# Patient Record
Sex: MALE | Race: WHITE | NOT HISPANIC OR LATINO | Employment: OTHER | URBAN - METROPOLITAN AREA
[De-identification: names, ages, dates, MRNs, and addresses within clinical notes are randomized per-mention and may not be internally consistent; named-entity substitution may affect disease eponyms.]

---

## 2021-04-08 ENCOUNTER — OFFICE VISIT (OUTPATIENT)
Dept: WOUND CARE | Facility: HOSPITAL | Age: 71
End: 2021-04-08

## 2021-04-08 PROCEDURE — G0463 HOSPITAL OUTPT CLINIC VISIT: HCPCS

## 2021-06-15 ENCOUNTER — TRANSCRIBE ORDERS (OUTPATIENT)
Dept: ADMINISTRATIVE | Facility: HOSPITAL | Age: 71
End: 2021-06-15

## 2021-06-15 DIAGNOSIS — C44.41 BASAL CELL CARCINOMA (BCC) OF SCALP: Primary | ICD-10-CM

## 2021-06-28 ENCOUNTER — TRANSCRIBE ORDERS (OUTPATIENT)
Dept: ADMINISTRATIVE | Facility: HOSPITAL | Age: 71
End: 2021-06-28

## 2021-06-29 ENCOUNTER — HOSPITAL ENCOUNTER (OUTPATIENT)
Dept: MRI IMAGING | Facility: HOSPITAL | Age: 71
Discharge: HOME | End: 2021-06-29
Admitting: DERMATOLOGY

## 2021-06-29 DIAGNOSIS — C44.41 BASAL CELL CARCINOMA (BCC) OF SCALP: ICD-10-CM

## 2021-06-29 LAB — CREAT BLDA-MCNC: 1.2 MG/DL (ref 0.6–1.3)

## 2021-06-29 PROCEDURE — 25010000002 GADOTERIDOL PER 1 ML: Performed by: DERMATOLOGY

## 2021-06-29 PROCEDURE — 82565 ASSAY OF CREATININE: CPT

## 2021-06-29 PROCEDURE — 70543 MRI ORBT/FAC/NCK W/O &W/DYE: CPT

## 2021-06-29 PROCEDURE — A9579 GAD-BASE MR CONTRAST NOS,1ML: HCPCS | Performed by: DERMATOLOGY

## 2021-06-29 RX ADMIN — GADOTERIDOL 20 ML: 279.3 INJECTION, SOLUTION INTRAVENOUS at 15:47

## 2023-05-10 ENCOUNTER — TELEPHONE (OUTPATIENT)
Dept: RADIATION ONCOLOGY | Facility: HOSPITAL | Age: 73
End: 2023-05-10

## 2023-05-10 NOTE — TELEPHONE ENCOUNTER
Left message with a return number to call. Calling to schedule a consult appt with Dr La per referral of [facility].

## 2023-05-11 ENCOUNTER — HOSPITAL ENCOUNTER (OUTPATIENT)
Dept: RADIATION ONCOLOGY | Facility: HOSPITAL | Age: 73
Setting detail: RADIATION/ONCOLOGY SERIES
End: 2023-05-11
Payer: MEDICAID

## 2023-05-16 PROBLEM — C44.219 BASAL CELL CARCINOMA (BCC) OF HELIX OF LEFT EAR: Status: ACTIVE | Noted: 2023-05-16

## 2023-05-16 NOTE — PROGRESS NOTES
RADIATION THERAPY CONSULT NOTE    NAME: Jed Strong  YOB: 1950  MRN #: [id number]  DATE OF SERVICE: 5/18/2023  REFERRING PROVIDER: Kiet Frank Jr., MD  [address] [facility] [place] [address]  PRIMARY CARE PROVIDER: Jessika Murray APRN    DIAGNOSIS:    Encounter Diagnosis   Name Primary?   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                              REQUESTING PHYSICIAN:    Kiet Frank Jr., Md  [address] [place] [address]    RECORDS OBTAINED:  Records of the patients history including those obtained from the referring provider were reviewed and summarized in detail.    HISTORY OF PRESENT ILLNESS:  Jed Strong is a 73 y.o. male who was recently diagnosed with recurrent infiltrative basal cell carcinoma of the left inferior posterior helix on 4/17/2023 following biopsy by shave method. The area was growing and not healing and moderate in severity. He had the growth for years.  His initial basal cell on the L ear in this region was 2004 per the patient.  Previous history of nonmelanoma skin cancer and sunburns. He has no history of immunosuppression and no history of organ transplantation.    He underwent Mohs surgical procedure on 5/9/2023 by Dr. Stanley Howard. Preop size 1.6 cm x 1.1 cm.  Mohs was performed in 2 stages; final defect size 3.5 cm x 1.6 cm. Frozen section analysis after stage II showed no residual tumor and no malignant cells in the sections that were examined, though perineural inflammation was present. Margins were negative. Complicating clinical features of the lesion include clinical area critical for tissue conservation, large size, located in area of high recurrence, poorly  defined clinical tumor borders, and suspicious deep tissue invasion of the tumor. He was referred to our clinic for consideration of adjuvant radiation therapy.     The following portions of the patient's history were reviewed and updated as appropriate: allergies, current medications, past family history, past medical history, past social history, past surgical history and problem list. Reviewed with the patient and remain unchanged.    PAST MEDICAL HISTORY:  he has a past medical history of Blindness of both eyes.    MEDICATIONS:  No current outpatient medications on file.    ALLERGIES:  No Known Allergies  PAST SURGICAL HISTORY:  he has no past surgical history on file.    PREVIOUS RADIOTHERAPY OR CHEMOTHERAPY:  No prior XRT; Erivedge topical used.    FAMILY HISTORY:  Non-contributory    SOCIAL HISTORY: Working full time as a LCSW.    he reports that he quit smoking about 30 years ago. His smoking use included cigarettes. He has a 30.00 pack-year smoking history. He has never used smokeless tobacco. He reports current alcohol use. He reports that he does not use drugs.    PAIN AND PAIN MANAGEMENT:  Denies pain.  Vitals:    05/18/23 1406   BP: 151/83   Pulse: 78   Resp: 18   SpO2: 95%   Weight: 109 kg (240 lb)   PainSc: 0-No pain     NUTRITIONAL STATUS:   no issues  KPS:  90      REVIEW OF SYSTEMS:   Review of Systems   General: No fevers, chills, weight change, or drenching night sweats. Skin: as noted above.  HEENT: No change in vision or hearing, no headaches.  Neck: No dysphagia or masses.  Heme/Lymph: No easy bruising or bleeding.  Respiratory System: No shortness of breath or cough.  Cardiovascular: No chest pain, palpitations, or dyspnea on exertion.  - Pacemaker. GI: No nausea, vomiting, diarrhea, melena, or hematochezia.  : No dysuria or hematuria.  Endocrine: No heat or cold intolerance. Musculoskeletal: No myalgias or arthralgias.  Neuro: No weakness, numbness, syncope, or seizures. Psych: No mood  changes or depression. Ext: Denies swelling.      Objective   VITAL SIGNS:  Vitals:    05/18/23 1406   BP: 151/83   Pulse: 78   Resp: 18   SpO2: 95%   Weight: 109 kg (240 lb)   PainSc: 0-No pain     PHYSICAL EXAM:  GENERAL:  No apparent distress. Sitting comfortably in room.    HEENT:  Normocephalic, atraumatic. Pupils are equal, round, reactive to light. Sclera anicteric. Conjunctiva not injected. Oropharynx without erythema, ulcerations or thrush.   NECK:  Supple with no masses.  LYMPHATIC:  No cervical, supraclavicular or axillary adenopathy appreciated bilaterally.   CARDIOVASCULAR:  S1 & S2 detected; no murmurs, rubs or gallops.  CHEST:  Clear to auscultation bilaterally; no wheezes, crackles or rubs. Work of breathing normal.  ABDOMEN:  Bowel sounds present. Abdomen is soft, nontender, nondistended.   MUSCULOSKELETAL:  No tenderness to palpation along the spine or scapulae. Normal range of motion.  EXTREMITIES:  No clubbing, cyanosis, edema.  SKIN:  L ear healing appropriately, sore area behind:    NEUROLOGIC:  Cranial nerves II-XII grossly intact bilaterally. No focal neurologic deficits.  PSYCHIATRIC:  Alert, aware, and appropriate.      PERTINENT IMAGING/PATHOLOGY/LABS (Medical Decision Making):      COORDINATION OF CARE:  A copy of this note is sent to the referring provider.    PATHOLOGY (Reviewed): as noted above  Perineural and recurrenct status lead to adjuvant XRT recommendation.    IMAGING (Reviewed):     Lab Results   Component Value Date    CREATININE 1.20 06/29/2021     Assessment & Plan   ASSESSMENT AND PLAN:    1. 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  -Informed consent obtained.    This assessment comes from my review of  the imaging, pathology, physician notes and other pertinent information as mentioned.    DISPOSITION:  CT sim ordered.    TIME SPENT WITH PATIENT:  I spent 45 minutes caring for Jed on this date of service. This time includes time spent by me in the following activities: preparing for the visit, reviewing tests, obtaining and/or reviewing a separately obtained history, performing a medically appropriate examination and/or evaluation, counseling and educating the patient/family/caregiver, documenting information in the medical record, independently interpreting results and communicating that information with the patient/family/caregiver and care coordination           CC: Kiet Frank Jr., * Jessika Murray APRN John A Cox, MD  5/18/2023  3:58 PM EDT

## 2023-05-18 ENCOUNTER — CONSULT (OUTPATIENT)
Dept: RADIATION ONCOLOGY | Facility: HOSPITAL | Age: 73
End: 2023-05-18
Payer: MEDICAID

## 2023-05-18 VITALS
SYSTOLIC BLOOD PRESSURE: 151 MMHG | HEART RATE: 78 BPM | DIASTOLIC BLOOD PRESSURE: 83 MMHG | OXYGEN SATURATION: 95 % | WEIGHT: 240 LBS | RESPIRATION RATE: 18 BRPM

## 2023-05-18 DIAGNOSIS — C44.219: Primary | ICD-10-CM

## 2023-05-18 PROCEDURE — G0463 HOSPITAL OUTPT CLINIC VISIT: HCPCS | Performed by: RADIOLOGY

## 2023-06-01 ENCOUNTER — HOSPITAL ENCOUNTER (OUTPATIENT)
Dept: RADIATION ONCOLOGY | Facility: HOSPITAL | Age: 73
Discharge: HOME | End: 2023-06-01

## 2023-08-04 ENCOUNTER — HOSPITAL ENCOUNTER (OUTPATIENT)
Dept: RADIATION ONCOLOGY | Facility: HOSPITAL | Age: 73
Setting detail: RADIATION/ONCOLOGY SERIES
End: 2023-08-04
Payer: COMMERCIAL

## 2023-08-07 ENCOUNTER — HOSPITAL ENCOUNTER (OUTPATIENT)
Dept: RADIATION ONCOLOGY | Facility: HOSPITAL | Age: 73
Discharge: HOME | End: 2023-08-07
Payer: COMMERCIAL

## 2023-08-07 PROCEDURE — 77290 THER RAD SIMULAJ FIELD CPLX: CPT | Performed by: RADIOLOGY

## 2023-08-07 PROCEDURE — 77334 RADIATION TREATMENT AID(S): CPT | Performed by: RADIOLOGY

## 2023-09-12 ENCOUNTER — HOSPITAL ENCOUNTER (OUTPATIENT)
Dept: RADIATION ONCOLOGY | Facility: HOSPITAL | Age: 73
Setting detail: RADIATION/ONCOLOGY SERIES
End: 2023-09-12
Payer: COMMERCIAL

## 2023-09-13 ENCOUNTER — HOSPITAL ENCOUNTER (OUTPATIENT)
Dept: RADIATION ONCOLOGY | Facility: HOSPITAL | Age: 73
Discharge: HOME | End: 2023-09-13
Payer: COMMERCIAL

## 2023-09-18 ENCOUNTER — APPOINTMENT (OUTPATIENT)
Dept: RADIATION ONCOLOGY | Facility: HOSPITAL | Age: 73
End: 2023-09-18
Payer: COMMERCIAL

## 2023-09-18 PROCEDURE — 77301 RADIOTHERAPY DOSE PLAN IMRT: CPT | Performed by: RADIOLOGY

## 2023-09-18 PROCEDURE — 77300 RADIATION THERAPY DOSE PLAN: CPT | Performed by: RADIOLOGY

## 2023-09-18 PROCEDURE — 77338 DESIGN MLC DEVICE FOR IMRT: CPT | Performed by: RADIOLOGY

## 2023-09-19 ENCOUNTER — HOSPITAL ENCOUNTER (OUTPATIENT)
Dept: RADIATION ONCOLOGY | Facility: HOSPITAL | Age: 73
Discharge: HOME | End: 2023-09-19
Payer: COMMERCIAL

## 2023-09-19 LAB
RAD ONC ARIA COURSE ID: NORMAL
RAD ONC ARIA COURSE LAST TREATMENT DATE: NORMAL
RAD ONC ARIA COURSE START DATE: NORMAL
RAD ONC ARIA COURSE TREATMENT ELAPSED DAYS: 0
RAD ONC ARIA FIRST TREATMENT DATE: NORMAL
RAD ONC ARIA PLAN FRACTIONS TREATED TO DATE: 1
RAD ONC ARIA PLAN ID: NORMAL
RAD ONC ARIA PLAN PRESCRIBED DOSE PER FRACTION: 2 GY
RAD ONC ARIA PLAN PRIMARY REFERENCE POINT: NORMAL
RAD ONC ARIA PLAN TOTAL FRACTIONS PRESCRIBED: 33
RAD ONC ARIA PLAN TOTAL PRESCRIBED DOSE: 6600 CGY
RAD ONC ARIA REFERENCE POINT DOSAGE GIVEN TO DATE: 2 GY
RAD ONC ARIA REFERENCE POINT ID: NORMAL
RAD ONC ARIA REFERENCE POINT SESSION DOSAGE GIVEN: 2 GY

## 2023-09-19 PROCEDURE — 77386: CPT | Performed by: RADIOLOGY

## 2023-09-20 ENCOUNTER — HOSPITAL ENCOUNTER (OUTPATIENT)
Dept: RADIATION ONCOLOGY | Facility: HOSPITAL | Age: 73
Discharge: HOME | End: 2023-09-20
Payer: COMMERCIAL

## 2023-09-20 LAB
RAD ONC ARIA COURSE ID: NORMAL
RAD ONC ARIA COURSE LAST TREATMENT DATE: NORMAL
RAD ONC ARIA COURSE START DATE: NORMAL
RAD ONC ARIA COURSE TREATMENT ELAPSED DAYS: 1
RAD ONC ARIA FIRST TREATMENT DATE: NORMAL
RAD ONC ARIA PLAN FRACTIONS TREATED TO DATE: 1
RAD ONC ARIA PLAN ID: NORMAL
RAD ONC ARIA PLAN PRESCRIBED DOSE PER FRACTION: 2 GY
RAD ONC ARIA PLAN PRIMARY REFERENCE POINT: NORMAL
RAD ONC ARIA PLAN TOTAL FRACTIONS PRESCRIBED: 32
RAD ONC ARIA PLAN TOTAL PRESCRIBED DOSE: 6400 CGY
RAD ONC ARIA REFERENCE POINT DOSAGE GIVEN TO DATE: 4 GY
RAD ONC ARIA REFERENCE POINT ID: NORMAL
RAD ONC ARIA REFERENCE POINT SESSION DOSAGE GIVEN: 2 GY

## 2023-09-20 PROCEDURE — 77386: CPT | Performed by: RADIOLOGY

## 2023-09-21 ENCOUNTER — HOSPITAL ENCOUNTER (OUTPATIENT)
Dept: RADIATION ONCOLOGY | Facility: HOSPITAL | Age: 73
Discharge: HOME | End: 2023-09-21
Payer: COMMERCIAL

## 2023-09-21 LAB
RAD ONC ARIA COURSE ID: NORMAL
RAD ONC ARIA COURSE LAST TREATMENT DATE: NORMAL
RAD ONC ARIA COURSE START DATE: NORMAL
RAD ONC ARIA COURSE TREATMENT ELAPSED DAYS: 2
RAD ONC ARIA FIRST TREATMENT DATE: NORMAL
RAD ONC ARIA PLAN FRACTIONS TREATED TO DATE: 1
RAD ONC ARIA PLAN ID: NORMAL
RAD ONC ARIA PLAN PRESCRIBED DOSE PER FRACTION: 2 GY
RAD ONC ARIA PLAN PRIMARY REFERENCE POINT: NORMAL
RAD ONC ARIA PLAN TOTAL FRACTIONS PRESCRIBED: 31
RAD ONC ARIA PLAN TOTAL PRESCRIBED DOSE: 6200 CGY
RAD ONC ARIA REFERENCE POINT DOSAGE GIVEN TO DATE: 6 GY
RAD ONC ARIA REFERENCE POINT ID: NORMAL
RAD ONC ARIA REFERENCE POINT SESSION DOSAGE GIVEN: 2 GY

## 2023-09-21 PROCEDURE — 77336 RADIATION PHYSICS CONSULT: CPT | Performed by: RADIOLOGY

## 2023-09-21 PROCEDURE — 77386: CPT | Performed by: RADIOLOGY

## 2023-09-22 ENCOUNTER — HOSPITAL ENCOUNTER (OUTPATIENT)
Dept: RADIATION ONCOLOGY | Facility: HOSPITAL | Age: 73
Discharge: HOME | End: 2023-09-22
Payer: COMMERCIAL

## 2023-09-22 LAB
RAD ONC ARIA COURSE ID: NORMAL
RAD ONC ARIA COURSE LAST TREATMENT DATE: NORMAL
RAD ONC ARIA COURSE START DATE: NORMAL
RAD ONC ARIA COURSE TREATMENT ELAPSED DAYS: 3
RAD ONC ARIA FIRST TREATMENT DATE: NORMAL
RAD ONC ARIA PLAN FRACTIONS TREATED TO DATE: 2
RAD ONC ARIA PLAN ID: NORMAL
RAD ONC ARIA PLAN PRESCRIBED DOSE PER FRACTION: 2 GY
RAD ONC ARIA PLAN PRIMARY REFERENCE POINT: NORMAL
RAD ONC ARIA PLAN TOTAL FRACTIONS PRESCRIBED: 31
RAD ONC ARIA PLAN TOTAL PRESCRIBED DOSE: 6200 CGY
RAD ONC ARIA REFERENCE POINT DOSAGE GIVEN TO DATE: 8 GY
RAD ONC ARIA REFERENCE POINT ID: NORMAL
RAD ONC ARIA REFERENCE POINT SESSION DOSAGE GIVEN: 2 GY

## 2023-09-22 PROCEDURE — 77386: CPT | Performed by: RADIOLOGY

## 2023-09-25 ENCOUNTER — RADIATION ONCOLOGY WEEKLY ASSESSMENT (OUTPATIENT)
Dept: RADIATION ONCOLOGY | Facility: HOSPITAL | Age: 73
End: 2023-09-25

## 2023-09-25 ENCOUNTER — HOSPITAL ENCOUNTER (OUTPATIENT)
Dept: RADIATION ONCOLOGY | Facility: HOSPITAL | Age: 73
Discharge: HOME | End: 2023-09-25
Payer: COMMERCIAL

## 2023-09-25 VITALS
OXYGEN SATURATION: 95 % | RESPIRATION RATE: 18 BRPM | SYSTOLIC BLOOD PRESSURE: 144 MMHG | HEART RATE: 66 BPM | DIASTOLIC BLOOD PRESSURE: 85 MMHG

## 2023-09-25 DIAGNOSIS — C44.219: Primary | ICD-10-CM

## 2023-09-25 LAB
RAD ONC ARIA COURSE ID: NORMAL
RAD ONC ARIA COURSE LAST TREATMENT DATE: NORMAL
RAD ONC ARIA COURSE START DATE: NORMAL
RAD ONC ARIA COURSE TREATMENT ELAPSED DAYS: 6
RAD ONC ARIA FIRST TREATMENT DATE: NORMAL
RAD ONC ARIA PLAN FRACTIONS TREATED TO DATE: 3
RAD ONC ARIA PLAN ID: NORMAL
RAD ONC ARIA PLAN PRESCRIBED DOSE PER FRACTION: 2 GY
RAD ONC ARIA PLAN PRIMARY REFERENCE POINT: NORMAL
RAD ONC ARIA PLAN TOTAL FRACTIONS PRESCRIBED: 31
RAD ONC ARIA PLAN TOTAL PRESCRIBED DOSE: 6200 CGY
RAD ONC ARIA REFERENCE POINT DOSAGE GIVEN TO DATE: 10 GY
RAD ONC ARIA REFERENCE POINT ID: NORMAL
RAD ONC ARIA REFERENCE POINT SESSION DOSAGE GIVEN: 2 GY

## 2023-09-25 PROCEDURE — 77386: CPT | Performed by: RADIOLOGY

## 2023-09-25 NOTE — PROGRESS NOTES
NAME: Jed Strong DATE: 2023   : 1950    MRN: [id number] DIAGNOSIS:   Encounter Diagnosis   Name Primary?    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 ID Energy Dose/Fx (cGy) #Fx Dose Correction (cGy) Total Dose (cGy) Start Date End Date Elapsed Days   LtEar:1 LtEar 6X 200 2 / 2 0 400 2023 1   LtEar:2 LtEar 6X 200 3 /  0 600 2023 5      fractions delivered  Recurrent area post-auricular    General:     Review of Systems  [x] No new complaints [] Cough   [] Dysphagia  [] Bowel changes   [] Fever/chills  [] Nausea/vomiting  [] Skin itching/soreness/breakdown  [x] Fatigue, severity: 0 [x] Pain, severity: 0, location:     Nausea regimen:   Pain medication regimen:   Bowel regimen:   Skin regimen: aquaphor    Subjective:  drainage from recurrence behind the ear has stopped.    KPS: 90     Vital Signs: /85   Pulse 66   Resp 18   SpO2 95%     Weight:   Wt Readings from Last 3 Encounters:   23 109 kg (240 lb)       Medication: No current outpatient medications on file.     LABS (Reviewed):    Chemistry   Lab Results   Component Value Date    CREATININE 1.20 2021       Physical Exam:     Neck: [] Lymphadenopathy  Lungs: [x] Clear to auscultation  CVS: [x] Regular rate & rhythm  Skin: [x] stGstrstastdstest:st st1st Comments/Notes:      [x] Review of labs, images, dosimetry, dose delivered, & treatment parameters.   Comments:     [x] Patient treatment setup reviewed.    Comments:     Recommendations:  continue XRT as noted  Skin care discussed    [x] Continue RT  [] Change RT Plan [] Hold RT, length:      Approved Electronically By:  Ian La MD, 2023, 12:17 EDT      Confidentiality of this medical record shall be maintained except when use or disclosure is required or permitted by law, regulation or written authorization by the patient.

## 2023-09-26 ENCOUNTER — HOSPITAL ENCOUNTER (OUTPATIENT)
Dept: RADIATION ONCOLOGY | Facility: HOSPITAL | Age: 73
Discharge: HOME | End: 2023-09-26
Payer: COMMERCIAL

## 2023-09-26 LAB
RAD ONC ARIA COURSE ID: NORMAL
RAD ONC ARIA COURSE LAST TREATMENT DATE: NORMAL
RAD ONC ARIA COURSE START DATE: NORMAL
RAD ONC ARIA COURSE TREATMENT ELAPSED DAYS: 7
RAD ONC ARIA FIRST TREATMENT DATE: NORMAL
RAD ONC ARIA PLAN FRACTIONS TREATED TO DATE: 4
RAD ONC ARIA PLAN ID: NORMAL
RAD ONC ARIA PLAN PRESCRIBED DOSE PER FRACTION: 2 GY
RAD ONC ARIA PLAN PRIMARY REFERENCE POINT: NORMAL
RAD ONC ARIA PLAN TOTAL FRACTIONS PRESCRIBED: 31
RAD ONC ARIA PLAN TOTAL PRESCRIBED DOSE: 6200 CGY
RAD ONC ARIA REFERENCE POINT DOSAGE GIVEN TO DATE: 12 GY
RAD ONC ARIA REFERENCE POINT ID: NORMAL
RAD ONC ARIA REFERENCE POINT SESSION DOSAGE GIVEN: 2 GY

## 2023-09-26 PROCEDURE — 77386: CPT | Performed by: RADIOLOGY

## 2023-09-27 ENCOUNTER — HOSPITAL ENCOUNTER (OUTPATIENT)
Dept: RADIATION ONCOLOGY | Facility: HOSPITAL | Age: 73
Discharge: HOME | End: 2023-09-27
Payer: COMMERCIAL

## 2023-09-27 LAB
RAD ONC ARIA COURSE ID: NORMAL
RAD ONC ARIA COURSE LAST TREATMENT DATE: NORMAL
RAD ONC ARIA COURSE START DATE: NORMAL
RAD ONC ARIA COURSE TREATMENT ELAPSED DAYS: 8
RAD ONC ARIA FIRST TREATMENT DATE: NORMAL
RAD ONC ARIA PLAN FRACTIONS TREATED TO DATE: 5
RAD ONC ARIA PLAN ID: NORMAL
RAD ONC ARIA PLAN PRESCRIBED DOSE PER FRACTION: 2 GY
RAD ONC ARIA PLAN PRIMARY REFERENCE POINT: NORMAL
RAD ONC ARIA PLAN TOTAL FRACTIONS PRESCRIBED: 31
RAD ONC ARIA PLAN TOTAL PRESCRIBED DOSE: 6200 CGY
RAD ONC ARIA REFERENCE POINT DOSAGE GIVEN TO DATE: 14 GY
RAD ONC ARIA REFERENCE POINT ID: NORMAL
RAD ONC ARIA REFERENCE POINT SESSION DOSAGE GIVEN: 2 GY

## 2023-09-27 PROCEDURE — 77336 RADIATION PHYSICS CONSULT: CPT | Performed by: RADIOLOGY

## 2023-09-27 PROCEDURE — 77386: CPT | Performed by: RADIOLOGY

## 2023-09-28 ENCOUNTER — HOSPITAL ENCOUNTER (OUTPATIENT)
Dept: RADIATION ONCOLOGY | Facility: HOSPITAL | Age: 73
Discharge: HOME | End: 2023-09-28
Payer: COMMERCIAL

## 2023-09-28 LAB
RAD ONC ARIA COURSE ID: NORMAL
RAD ONC ARIA COURSE LAST TREATMENT DATE: NORMAL
RAD ONC ARIA COURSE START DATE: NORMAL
RAD ONC ARIA COURSE TREATMENT ELAPSED DAYS: 9
RAD ONC ARIA FIRST TREATMENT DATE: NORMAL
RAD ONC ARIA PLAN FRACTIONS TREATED TO DATE: 6
RAD ONC ARIA PLAN ID: NORMAL
RAD ONC ARIA PLAN PRESCRIBED DOSE PER FRACTION: 2 GY
RAD ONC ARIA PLAN PRIMARY REFERENCE POINT: NORMAL
RAD ONC ARIA PLAN TOTAL FRACTIONS PRESCRIBED: 31
RAD ONC ARIA PLAN TOTAL PRESCRIBED DOSE: 6200 CGY
RAD ONC ARIA REFERENCE POINT DOSAGE GIVEN TO DATE: 16 GY
RAD ONC ARIA REFERENCE POINT ID: NORMAL
RAD ONC ARIA REFERENCE POINT SESSION DOSAGE GIVEN: 2 GY

## 2023-09-28 PROCEDURE — 77386: CPT | Performed by: RADIOLOGY

## 2023-09-29 ENCOUNTER — HOSPITAL ENCOUNTER (OUTPATIENT)
Dept: RADIATION ONCOLOGY | Facility: HOSPITAL | Age: 73
Discharge: HOME | End: 2023-09-29
Payer: COMMERCIAL

## 2023-09-29 LAB
RAD ONC ARIA COURSE ID: NORMAL
RAD ONC ARIA COURSE LAST TREATMENT DATE: NORMAL
RAD ONC ARIA COURSE START DATE: NORMAL
RAD ONC ARIA COURSE TREATMENT ELAPSED DAYS: 10
RAD ONC ARIA FIRST TREATMENT DATE: NORMAL
RAD ONC ARIA PLAN FRACTIONS TREATED TO DATE: 7
RAD ONC ARIA PLAN ID: NORMAL
RAD ONC ARIA PLAN PRESCRIBED DOSE PER FRACTION: 2 GY
RAD ONC ARIA PLAN PRIMARY REFERENCE POINT: NORMAL
RAD ONC ARIA PLAN TOTAL FRACTIONS PRESCRIBED: 31
RAD ONC ARIA PLAN TOTAL PRESCRIBED DOSE: 6200 CGY
RAD ONC ARIA REFERENCE POINT DOSAGE GIVEN TO DATE: 18 GY
RAD ONC ARIA REFERENCE POINT ID: NORMAL
RAD ONC ARIA REFERENCE POINT SESSION DOSAGE GIVEN: 2 GY

## 2023-09-29 PROCEDURE — 77386: CPT | Performed by: RADIOLOGY

## 2023-10-02 ENCOUNTER — HOSPITAL ENCOUNTER (OUTPATIENT)
Dept: RADIATION ONCOLOGY | Facility: HOSPITAL | Age: 73
Setting detail: RADIATION/ONCOLOGY SERIES
End: 2023-10-02
Payer: COMMERCIAL

## 2023-10-02 ENCOUNTER — HOSPITAL ENCOUNTER (OUTPATIENT)
Dept: RADIATION ONCOLOGY | Facility: HOSPITAL | Age: 73
Discharge: HOME | End: 2023-10-02
Payer: COMMERCIAL

## 2023-10-02 ENCOUNTER — RADIATION ONCOLOGY WEEKLY ASSESSMENT (OUTPATIENT)
Dept: RADIATION ONCOLOGY | Facility: HOSPITAL | Age: 73
End: 2023-10-02
Payer: COMMERCIAL

## 2023-10-02 VITALS
RESPIRATION RATE: 16 BRPM | TEMPERATURE: 98.3 F | SYSTOLIC BLOOD PRESSURE: 186 MMHG | OXYGEN SATURATION: 95 % | DIASTOLIC BLOOD PRESSURE: 92 MMHG | WEIGHT: 239.6 LBS | HEART RATE: 74 BPM

## 2023-10-02 DIAGNOSIS — C44.219: Primary | ICD-10-CM

## 2023-10-02 LAB
RAD ONC ARIA COURSE ID: NORMAL
RAD ONC ARIA COURSE LAST TREATMENT DATE: NORMAL
RAD ONC ARIA COURSE START DATE: NORMAL
RAD ONC ARIA COURSE TREATMENT ELAPSED DAYS: 13
RAD ONC ARIA FIRST TREATMENT DATE: NORMAL
RAD ONC ARIA PLAN FRACTIONS TREATED TO DATE: 8
RAD ONC ARIA PLAN ID: NORMAL
RAD ONC ARIA PLAN PRESCRIBED DOSE PER FRACTION: 2 GY
RAD ONC ARIA PLAN PRIMARY REFERENCE POINT: NORMAL
RAD ONC ARIA PLAN TOTAL FRACTIONS PRESCRIBED: 31
RAD ONC ARIA PLAN TOTAL PRESCRIBED DOSE: 6200 CGY
RAD ONC ARIA REFERENCE POINT DOSAGE GIVEN TO DATE: 20 GY
RAD ONC ARIA REFERENCE POINT ID: NORMAL
RAD ONC ARIA REFERENCE POINT SESSION DOSAGE GIVEN: 2 GY

## 2023-10-02 PROCEDURE — 77014 CHG CT GUIDANCE RADIATION THERAPY FLDS PLACEMENT: CPT | Performed by: RADIOLOGY

## 2023-10-02 PROCEDURE — 77386: CPT | Performed by: RADIOLOGY

## 2023-10-02 NOTE — PROGRESS NOTES
NAME: Jed Strong DATE: 10/2/2023   : 1950    MRN: [id number] DIAGNOSIS: Recurrent Basal Cell      RADIATION ON TREATMENT VISIT NOTE - HEAD AND NECK     Treatment Summary    Treatment Site Ref. ID Energy Dose/Fx (cGy) #Fx Dose Correction (cGy) Total Dose (cGy) Start Date End Date Elapsed Days   LtMedHilar LtMedHilar 6X 200  0 3,800 2023 10/2/2023 26       General:     Review of Systems  [x] No new complaints [] Dysphagia  [] Eye pain/discharge  [] Dry mouth   [] Hair loss  [] Earache/pain/discharge  [] Loss of taste  [] Supplemental feeding: [] PO   [] PEG/NG    kcals/day  [x] Mouth soreness/tenderness, severity:  0  [x] Fatigue,  severity:  0 [x] Pain, severity: 0, location: denies pain  [] Oral care regimen:    [] Pain medication regimen:    [x] Skin regimen:  Eucerin     Subjective:  He states that he is doing well with treatments so far, no concerns or complaints today.  Denies any bleeding or discharge  Area near tragus is sore near mask rubbing/compression--using Aquaphor post treatment     KPS: 90     Vital Signs: BP (!) 186/92   Pulse 74   Temp 98.3 °F (36.8 °C) (Oral)   Resp 16   Wt 109 kg (239 lb 9.6 oz)   SpO2 95%     Weight:   Wt Readings from Last 3 Encounters:   10/02/23 109 kg (239 lb 9.6 oz)   23 109 kg (240 lb)       Medication: No current outpatient medications on file.     LABS (Reviewed):    Chemistry   Lab Results   Component Value Date    CREATININE 1.2021       Physical Exam:     Neck:  [] Lymphadenopathy  Lungs:  [x] Clear to auscultation  HEENT:[x] Throat clear [x] Oral cavity clear  [] TM clear    [] Nares patent [] Xerostomia   [] Mucositis/stomatitis    [] Dysphagia  [] PERRLA/EOMI [] Voice changes/dysarthia    [] Otitis, external ear (non-infectious):   Skin:  [x] ndGndrndanddndend:nd nd2nd Comments/Notes: no xerostomia  Bleeding has stopped with his recurrent basal cell    [x] Review of labs, images, dosimetry, dose delivered, & treatment parameters.     Comments:     [x] Patient treatment setup reviewed.    Comments:     Recommendations:  continue supportive measures and skin care    [x] Continue RT  [] Change RT Plan [] Hold RT, length:      Approved Electronically By:  Ian La MD, 10/2/2023, 12:43 EDT      Confidentiality of this medical record shall be maintained except when use or disclosure is required or permitted by law, regulation or written authorization by the patient.

## 2023-10-03 ENCOUNTER — HOSPITAL ENCOUNTER (OUTPATIENT)
Dept: RADIATION ONCOLOGY | Facility: HOSPITAL | Age: 73
Discharge: HOME | End: 2023-10-03
Payer: COMMERCIAL

## 2023-10-03 LAB
RAD ONC ARIA COURSE ID: NORMAL
RAD ONC ARIA COURSE LAST TREATMENT DATE: NORMAL
RAD ONC ARIA COURSE START DATE: NORMAL
RAD ONC ARIA COURSE TREATMENT ELAPSED DAYS: 14
RAD ONC ARIA FIRST TREATMENT DATE: NORMAL
RAD ONC ARIA PLAN FRACTIONS TREATED TO DATE: 9
RAD ONC ARIA PLAN ID: NORMAL
RAD ONC ARIA PLAN PRESCRIBED DOSE PER FRACTION: 2 GY
RAD ONC ARIA PLAN PRIMARY REFERENCE POINT: NORMAL
RAD ONC ARIA PLAN TOTAL FRACTIONS PRESCRIBED: 31
RAD ONC ARIA PLAN TOTAL PRESCRIBED DOSE: 6200 CGY
RAD ONC ARIA REFERENCE POINT DOSAGE GIVEN TO DATE: 22 GY
RAD ONC ARIA REFERENCE POINT ID: NORMAL
RAD ONC ARIA REFERENCE POINT SESSION DOSAGE GIVEN: 2 GY

## 2023-10-03 PROCEDURE — 77386: CPT | Performed by: RADIOLOGY

## 2023-10-03 PROCEDURE — 77014 CHG CT GUIDANCE RADIATION THERAPY FLDS PLACEMENT: CPT | Performed by: RADIOLOGY

## 2023-10-05 ENCOUNTER — HOSPITAL ENCOUNTER (OUTPATIENT)
Dept: RADIATION ONCOLOGY | Facility: HOSPITAL | Age: 73
Discharge: HOME | End: 2023-10-05
Payer: COMMERCIAL

## 2023-10-05 LAB
RAD ONC ARIA COURSE ID: NORMAL
RAD ONC ARIA COURSE LAST TREATMENT DATE: NORMAL
RAD ONC ARIA COURSE START DATE: NORMAL
RAD ONC ARIA COURSE TREATMENT ELAPSED DAYS: 16
RAD ONC ARIA FIRST TREATMENT DATE: NORMAL
RAD ONC ARIA PLAN FRACTIONS TREATED TO DATE: 10
RAD ONC ARIA PLAN ID: NORMAL
RAD ONC ARIA PLAN PRESCRIBED DOSE PER FRACTION: 2 GY
RAD ONC ARIA PLAN PRIMARY REFERENCE POINT: NORMAL
RAD ONC ARIA PLAN TOTAL FRACTIONS PRESCRIBED: 31
RAD ONC ARIA PLAN TOTAL PRESCRIBED DOSE: 6200 CGY
RAD ONC ARIA REFERENCE POINT DOSAGE GIVEN TO DATE: 24 GY
RAD ONC ARIA REFERENCE POINT ID: NORMAL
RAD ONC ARIA REFERENCE POINT SESSION DOSAGE GIVEN: 2 GY

## 2023-10-05 PROCEDURE — 77014 CHG CT GUIDANCE RADIATION THERAPY FLDS PLACEMENT: CPT | Performed by: RADIOLOGY

## 2023-10-05 PROCEDURE — 77336 RADIATION PHYSICS CONSULT: CPT | Performed by: RADIOLOGY

## 2023-10-05 PROCEDURE — 77386: CPT | Performed by: RADIOLOGY

## 2023-10-06 ENCOUNTER — HOSPITAL ENCOUNTER (OUTPATIENT)
Dept: RADIATION ONCOLOGY | Facility: HOSPITAL | Age: 73
Discharge: HOME | End: 2023-10-06
Payer: COMMERCIAL

## 2023-10-06 LAB
RAD ONC ARIA COURSE ID: NORMAL
RAD ONC ARIA COURSE LAST TREATMENT DATE: NORMAL
RAD ONC ARIA COURSE START DATE: NORMAL
RAD ONC ARIA COURSE TREATMENT ELAPSED DAYS: 17
RAD ONC ARIA FIRST TREATMENT DATE: NORMAL
RAD ONC ARIA PLAN FRACTIONS TREATED TO DATE: 11
RAD ONC ARIA PLAN ID: NORMAL
RAD ONC ARIA PLAN PRESCRIBED DOSE PER FRACTION: 2 GY
RAD ONC ARIA PLAN PRIMARY REFERENCE POINT: NORMAL
RAD ONC ARIA PLAN TOTAL FRACTIONS PRESCRIBED: 31
RAD ONC ARIA PLAN TOTAL PRESCRIBED DOSE: 6200 CGY
RAD ONC ARIA REFERENCE POINT DOSAGE GIVEN TO DATE: 26 GY
RAD ONC ARIA REFERENCE POINT ID: NORMAL
RAD ONC ARIA REFERENCE POINT SESSION DOSAGE GIVEN: 2 GY

## 2023-10-06 PROCEDURE — 77386: CPT | Performed by: RADIOLOGY

## 2023-10-06 PROCEDURE — 77014 CHG CT GUIDANCE RADIATION THERAPY FLDS PLACEMENT: CPT | Performed by: RADIOLOGY

## 2023-10-09 ENCOUNTER — RADIATION ONCOLOGY WEEKLY ASSESSMENT (OUTPATIENT)
Dept: RADIATION ONCOLOGY | Facility: HOSPITAL | Age: 73
End: 2023-10-09
Payer: COMMERCIAL

## 2023-10-09 ENCOUNTER — HOSPITAL ENCOUNTER (OUTPATIENT)
Dept: RADIATION ONCOLOGY | Facility: HOSPITAL | Age: 73
Discharge: HOME | End: 2023-10-09
Payer: COMMERCIAL

## 2023-10-09 VITALS
DIASTOLIC BLOOD PRESSURE: 86 MMHG | RESPIRATION RATE: 16 BRPM | SYSTOLIC BLOOD PRESSURE: 145 MMHG | OXYGEN SATURATION: 96 % | TEMPERATURE: 98.3 F | HEART RATE: 70 BPM | WEIGHT: 236 LBS

## 2023-10-09 DIAGNOSIS — C44.219: Primary | ICD-10-CM

## 2023-10-09 LAB
RAD ONC ARIA COURSE ID: NORMAL
RAD ONC ARIA COURSE LAST TREATMENT DATE: NORMAL
RAD ONC ARIA COURSE START DATE: NORMAL
RAD ONC ARIA COURSE TREATMENT ELAPSED DAYS: 20
RAD ONC ARIA FIRST TREATMENT DATE: NORMAL
RAD ONC ARIA PLAN FRACTIONS TREATED TO DATE: 12
RAD ONC ARIA PLAN ID: NORMAL
RAD ONC ARIA PLAN PRESCRIBED DOSE PER FRACTION: 2 GY
RAD ONC ARIA PLAN PRIMARY REFERENCE POINT: NORMAL
RAD ONC ARIA PLAN TOTAL FRACTIONS PRESCRIBED: 31
RAD ONC ARIA PLAN TOTAL PRESCRIBED DOSE: 6200 CGY
RAD ONC ARIA REFERENCE POINT DOSAGE GIVEN TO DATE: 28 GY
RAD ONC ARIA REFERENCE POINT ID: NORMAL
RAD ONC ARIA REFERENCE POINT SESSION DOSAGE GIVEN: 2 GY

## 2023-10-09 PROCEDURE — 77014 CHG CT GUIDANCE RADIATION THERAPY FLDS PLACEMENT: CPT | Performed by: RADIOLOGY

## 2023-10-09 PROCEDURE — 77386: CPT | Performed by: RADIOLOGY

## 2023-10-09 NOTE — PROGRESS NOTES
NAME: Jed Strong DATE: 10/9/2023   : 1950    MRN: [id number] DIAGNOSIS:   Encounter Diagnosis   Name Primary?    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 ID Energy Dose/Fx (cGy) #Fx Dose Correction (cGy) Total Dose (cGy) Start Date End Date Elapsed Days   LtEar:1 LtEar 6X 200 2 /  0 400 2023 1   LtEar:2 LtEar 6X 200  0 2,400 2023 10/9/2023 18       General:     Review of Systems  [x] No new complaints [] Dysphagia  [] Eye pain/discharge  [] Dry mouth   [] Hair loss  [] Earache/pain/discharge  [] Loss of taste  [] Supplemental feeding: [] PO   [] PEG/NG    kcals/day  [] Mouth soreness/tenderness, severity:  0  [x] Fatigue,  severity:  1 [x] Pain, severity: 0, location: denies pain  [] Oral care regimen:    [] Pain medication regimen:    [] Skin regimen:  Eucerin BID    Subjective:  He's doing well with treatments, no concerns or complaints today.    KPS: 90     Vital Signs: /86   Pulse 70   Temp 98.3 øF (36.8 øC) (Oral)   Resp 16   Wt 107 kg (236 lb)   SpO2 96%     Weight:   Wt Readings from Last 3 Encounters:   10/09/23 107 kg (236 lb)   10/02/23 109 kg (239 lb 9.6 oz)   23 109 kg (240 lb)       Medication: No current outpatient medications on file.     LABS (Reviewed):    Chemistry   Lab Results   Component Value Date    CREATININE 1.20 2021       Physical Exam:     Neck:  [] Lymphadenopathy  Lungs:  [x] Clear to auscultation  HEENT:[x] Throat clear [] Oral cavity clear  [] TM clear    [] Nares patent [] Xerostomia   [] Mucositis/stomatitis    [] Dysphagia  [] PERRLA/EOMI [] Voice changes/dysarthia    [] Otitis, external ear (non-infectious):   Skin:  [x] ndGndrndanddndend:nd nd2nd Comments/Notes: good cytoreduction    [x] Review of labs, images, dosimetry, dose delivered, & treatment parameters.    Comments:     [x] Patient treatment setup  reviewed.    Comments:     Recommendations:  continue XRT and supportive measures  TLD of lesion again tomorrow.    [x] Continue RT  [] Change RT Plan [] Hold RT, length:      Approved Electronically By:  Ian La MD, 10/9/2023, 14:08 EDT      Confidentiality of this medical record shall be maintained except when use or disclosure is required or permitted by law, regulation or written authorization by the patient.

## 2023-10-10 ENCOUNTER — HOSPITAL ENCOUNTER (OUTPATIENT)
Dept: RADIATION ONCOLOGY | Facility: HOSPITAL | Age: 73
Discharge: HOME | End: 2023-10-10
Payer: COMMERCIAL

## 2023-10-10 LAB
RAD ONC ARIA COURSE ID: NORMAL
RAD ONC ARIA COURSE LAST TREATMENT DATE: NORMAL
RAD ONC ARIA COURSE START DATE: NORMAL
RAD ONC ARIA COURSE TREATMENT ELAPSED DAYS: 21
RAD ONC ARIA FIRST TREATMENT DATE: NORMAL
RAD ONC ARIA PLAN FRACTIONS TREATED TO DATE: 13
RAD ONC ARIA PLAN ID: NORMAL
RAD ONC ARIA PLAN PRESCRIBED DOSE PER FRACTION: 2 GY
RAD ONC ARIA PLAN PRIMARY REFERENCE POINT: NORMAL
RAD ONC ARIA PLAN TOTAL FRACTIONS PRESCRIBED: 31
RAD ONC ARIA PLAN TOTAL PRESCRIBED DOSE: 6200 CGY
RAD ONC ARIA REFERENCE POINT DOSAGE GIVEN TO DATE: 30 GY
RAD ONC ARIA REFERENCE POINT ID: NORMAL
RAD ONC ARIA REFERENCE POINT SESSION DOSAGE GIVEN: 2 GY

## 2023-10-10 PROCEDURE — 77014 CHG CT GUIDANCE RADIATION THERAPY FLDS PLACEMENT: CPT | Performed by: RADIOLOGY

## 2023-10-10 PROCEDURE — 77386: CPT | Performed by: RADIOLOGY

## 2023-10-11 ENCOUNTER — HOSPITAL ENCOUNTER (OUTPATIENT)
Dept: RADIATION ONCOLOGY | Facility: HOSPITAL | Age: 73
Discharge: HOME | End: 2023-10-11
Payer: COMMERCIAL

## 2023-10-11 LAB
RAD ONC ARIA COURSE ID: NORMAL
RAD ONC ARIA COURSE LAST TREATMENT DATE: NORMAL
RAD ONC ARIA COURSE START DATE: NORMAL
RAD ONC ARIA COURSE TREATMENT ELAPSED DAYS: 22
RAD ONC ARIA FIRST TREATMENT DATE: NORMAL
RAD ONC ARIA PLAN FRACTIONS TREATED TO DATE: 14
RAD ONC ARIA PLAN ID: NORMAL
RAD ONC ARIA PLAN PRESCRIBED DOSE PER FRACTION: 2 GY
RAD ONC ARIA PLAN PRIMARY REFERENCE POINT: NORMAL
RAD ONC ARIA PLAN TOTAL FRACTIONS PRESCRIBED: 31
RAD ONC ARIA PLAN TOTAL PRESCRIBED DOSE: 6200 CGY
RAD ONC ARIA REFERENCE POINT DOSAGE GIVEN TO DATE: 32 GY
RAD ONC ARIA REFERENCE POINT ID: NORMAL
RAD ONC ARIA REFERENCE POINT SESSION DOSAGE GIVEN: 2 GY

## 2023-10-11 PROCEDURE — 77014 CHG CT GUIDANCE RADIATION THERAPY FLDS PLACEMENT: CPT | Performed by: RADIOLOGY

## 2023-10-11 PROCEDURE — 77386: CPT | Performed by: RADIOLOGY

## 2023-10-12 ENCOUNTER — HOSPITAL ENCOUNTER (OUTPATIENT)
Dept: RADIATION ONCOLOGY | Facility: HOSPITAL | Age: 73
Discharge: HOME | End: 2023-10-12
Payer: COMMERCIAL

## 2023-10-12 LAB
RAD ONC ARIA COURSE ID: NORMAL
RAD ONC ARIA COURSE LAST TREATMENT DATE: NORMAL
RAD ONC ARIA COURSE START DATE: NORMAL
RAD ONC ARIA COURSE TREATMENT ELAPSED DAYS: 23
RAD ONC ARIA FIRST TREATMENT DATE: NORMAL
RAD ONC ARIA PLAN FRACTIONS TREATED TO DATE: 15
RAD ONC ARIA PLAN ID: NORMAL
RAD ONC ARIA PLAN PRESCRIBED DOSE PER FRACTION: 2 GY
RAD ONC ARIA PLAN PRIMARY REFERENCE POINT: NORMAL
RAD ONC ARIA PLAN TOTAL FRACTIONS PRESCRIBED: 31
RAD ONC ARIA PLAN TOTAL PRESCRIBED DOSE: 6200 CGY
RAD ONC ARIA REFERENCE POINT DOSAGE GIVEN TO DATE: 34 GY
RAD ONC ARIA REFERENCE POINT ID: NORMAL
RAD ONC ARIA REFERENCE POINT SESSION DOSAGE GIVEN: 2 GY

## 2023-10-12 PROCEDURE — 77336 RADIATION PHYSICS CONSULT: CPT | Performed by: RADIOLOGY

## 2023-10-12 PROCEDURE — 77386: CPT | Performed by: RADIOLOGY

## 2023-10-12 PROCEDURE — 77014 CHG CT GUIDANCE RADIATION THERAPY FLDS PLACEMENT: CPT | Performed by: RADIOLOGY

## 2023-10-13 ENCOUNTER — HOSPITAL ENCOUNTER (OUTPATIENT)
Dept: RADIATION ONCOLOGY | Facility: HOSPITAL | Age: 73
Discharge: HOME | End: 2023-10-13
Payer: COMMERCIAL

## 2023-10-13 DIAGNOSIS — L58.9 RADIATION-INDUCED DERMATITIS: ICD-10-CM

## 2023-10-13 DIAGNOSIS — C44.219: Primary | ICD-10-CM

## 2023-10-13 LAB
RAD ONC ARIA COURSE ID: NORMAL
RAD ONC ARIA COURSE LAST TREATMENT DATE: NORMAL
RAD ONC ARIA COURSE START DATE: NORMAL
RAD ONC ARIA COURSE TREATMENT ELAPSED DAYS: 24
RAD ONC ARIA FIRST TREATMENT DATE: NORMAL
RAD ONC ARIA PLAN FRACTIONS TREATED TO DATE: 16
RAD ONC ARIA PLAN ID: NORMAL
RAD ONC ARIA PLAN PRESCRIBED DOSE PER FRACTION: 2 GY
RAD ONC ARIA PLAN PRIMARY REFERENCE POINT: NORMAL
RAD ONC ARIA PLAN TOTAL FRACTIONS PRESCRIBED: 31
RAD ONC ARIA PLAN TOTAL PRESCRIBED DOSE: 6200 CGY
RAD ONC ARIA REFERENCE POINT DOSAGE GIVEN TO DATE: 36 GY
RAD ONC ARIA REFERENCE POINT ID: NORMAL
RAD ONC ARIA REFERENCE POINT SESSION DOSAGE GIVEN: 2 GY

## 2023-10-13 PROCEDURE — 77014 CHG CT GUIDANCE RADIATION THERAPY FLDS PLACEMENT: CPT | Performed by: RADIOLOGY

## 2023-10-13 PROCEDURE — 77386: CPT | Performed by: RADIOLOGY

## 2023-10-16 ENCOUNTER — HOSPITAL ENCOUNTER (OUTPATIENT)
Dept: RADIATION ONCOLOGY | Facility: HOSPITAL | Age: 73
Discharge: HOME | End: 2023-10-16
Payer: COMMERCIAL

## 2023-10-16 ENCOUNTER — RADIATION ONCOLOGY WEEKLY ASSESSMENT (OUTPATIENT)
Dept: RADIATION ONCOLOGY | Facility: HOSPITAL | Age: 73
End: 2023-10-16
Payer: COMMERCIAL

## 2023-10-16 VITALS
WEIGHT: 236.4 LBS | OXYGEN SATURATION: 95 % | HEART RATE: 64 BPM | DIASTOLIC BLOOD PRESSURE: 80 MMHG | TEMPERATURE: 98.3 F | SYSTOLIC BLOOD PRESSURE: 154 MMHG | RESPIRATION RATE: 16 BRPM

## 2023-10-16 DIAGNOSIS — C44.219: Primary | ICD-10-CM

## 2023-10-16 LAB
RAD ONC ARIA COURSE ID: NORMAL
RAD ONC ARIA COURSE LAST TREATMENT DATE: NORMAL
RAD ONC ARIA COURSE START DATE: NORMAL
RAD ONC ARIA COURSE TREATMENT ELAPSED DAYS: 27
RAD ONC ARIA FIRST TREATMENT DATE: NORMAL
RAD ONC ARIA PLAN FRACTIONS TREATED TO DATE: 17
RAD ONC ARIA PLAN ID: NORMAL
RAD ONC ARIA PLAN PRESCRIBED DOSE PER FRACTION: 2 GY
RAD ONC ARIA PLAN PRIMARY REFERENCE POINT: NORMAL
RAD ONC ARIA PLAN TOTAL FRACTIONS PRESCRIBED: 31
RAD ONC ARIA PLAN TOTAL PRESCRIBED DOSE: 6200 CGY
RAD ONC ARIA REFERENCE POINT DOSAGE GIVEN TO DATE: 38 GY
RAD ONC ARIA REFERENCE POINT ID: NORMAL
RAD ONC ARIA REFERENCE POINT SESSION DOSAGE GIVEN: 2 GY

## 2023-10-16 PROCEDURE — FACE2FACE: Performed by: RADIOLOGY

## 2023-10-16 PROCEDURE — 77014 CHG CT GUIDANCE RADIATION THERAPY FLDS PLACEMENT: CPT | Performed by: RADIOLOGY

## 2023-10-16 PROCEDURE — 77386: CPT | Performed by: RADIOLOGY

## 2023-10-16 NOTE — PROGRESS NOTES
"NAME: Jed Strong DATE: 10/16/2023   : 1950    MRN: [id number] DIAGNOSIS:   Encounter Diagnosis   Name Primary?    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 ID Energy Dose/Fx (cGy) #Fx Dose Correction (cGy) Total Dose (cGy) Start Date End Date Elapsed Days   LtEar:1 LtEar 6X 200 2 / 2 0 400 2023 1   LtEar:2 LtEar 6X 200  0 3,400 2023 10/16/2023 25       General:     Review of Systems  [x] No new complaints [] Dysphagia  [] Eye pain/discharge  [] Dry mouth   [] Hair loss  [] Earache/pain/discharge  [] Loss of taste  [] Supplemental feeding: [] PO   [] PEG/NG    kcals/day  [] Mouth soreness/tenderness, severity:  0  [x] Fatigue,  severity:  1 [x] Pain, severity: 0, location: denies pain  [] Oral care regimen:    [] Pain medication regimen:    [x] Skin regimen:  Aquaphor BID + Silvadene (not picked up yet)    Subjective:  He states that he is doing well with treatments, but does have a little irritation, silvadene was sent in, but they have not picked it up yet. No other concerns or complaints.    KPS: 90     Vital Signs: /80   Pulse 64   Temp 98.3 °F (36.8 °C) (Oral)   Resp 16   Wt 107 kg (236 lb 6.4 oz)   SpO2 95%     Weight:   Wt Readings from Last 3 Encounters:   10/16/23 107 kg (236 lb 6.4 oz)   10/09/23 107 kg (236 lb)   10/02/23 109 kg (239 lb 9.6 oz)       Medication:   Current Outpatient Medications:     silver sulfadiazine (SILVADENE, SSD) 1 % cream, Apply 1 application  topically to the appropriate area as directed 2 (Two) Times a Day. Behind left ear, Disp: 50 g, Rfl: 2     LABS (Reviewed):  Hematology  No results found for: \"WBC\", \"RBC\", \"HGB\", \"HCT\", \"PLT\"  Chemistry   Lab Results   Component Value Date    CREATININE 1.20 2021       Physical Exam:     Neck:  [] Lymphadenopathy  Lungs:  [x] Clear to auscultation  HEENT:[x] Throat " clear [x] Oral cavity clear  [] TM clear    [] Nares patent [] Xerostomia   [] Mucositis/stomatitis    [] Dysphagia  [] PERRLA/EOMI [] Voice changes/dysarthia    [] Otitis, external ear (non-infectious):   Skin:  [x] Grade: early grade 1-2 changes/transitioning--stressed starting the silvagene  TLD reports came back and will monitor but will not be planning [place] boost given readings.  Coverage is good.    Comments/Notes:     [x] Review of labs, images, dosimetry, dose delivered, & treatment parameters.    Comments:     [x] Patient treatment setup reviewed.    Comments:     Recommendations:  Instructed to start Silvadene TID today.    [x] Continue RT  [] Change RT Plan [] Hold RT, length:      Approved Electronically By:  Ian La MD, 10/16/2023, 15:37 EDT      Confidentiality of this medical record shall be maintained except when use or disclosure is required or permitted by law, regulation or written authorization by the patient.

## 2023-10-17 ENCOUNTER — HOSPITAL ENCOUNTER (OUTPATIENT)
Dept: RADIATION ONCOLOGY | Facility: HOSPITAL | Age: 73
Discharge: HOME | End: 2023-10-17
Payer: COMMERCIAL

## 2023-10-17 LAB
RAD ONC ARIA COURSE ID: NORMAL
RAD ONC ARIA COURSE LAST TREATMENT DATE: NORMAL
RAD ONC ARIA COURSE START DATE: NORMAL
RAD ONC ARIA COURSE TREATMENT ELAPSED DAYS: 28
RAD ONC ARIA FIRST TREATMENT DATE: NORMAL
RAD ONC ARIA PLAN FRACTIONS TREATED TO DATE: 18
RAD ONC ARIA PLAN ID: NORMAL
RAD ONC ARIA PLAN PRESCRIBED DOSE PER FRACTION: 2 GY
RAD ONC ARIA PLAN PRIMARY REFERENCE POINT: NORMAL
RAD ONC ARIA PLAN TOTAL FRACTIONS PRESCRIBED: 31
RAD ONC ARIA PLAN TOTAL PRESCRIBED DOSE: 6200 CGY
RAD ONC ARIA REFERENCE POINT DOSAGE GIVEN TO DATE: 40 GY
RAD ONC ARIA REFERENCE POINT ID: NORMAL
RAD ONC ARIA REFERENCE POINT SESSION DOSAGE GIVEN: 2 GY

## 2023-10-17 PROCEDURE — 77014 CHG CT GUIDANCE RADIATION THERAPY FLDS PLACEMENT: CPT | Performed by: RADIOLOGY

## 2023-10-17 PROCEDURE — 77386: CPT | Performed by: RADIOLOGY

## 2023-10-18 ENCOUNTER — HOSPITAL ENCOUNTER (OUTPATIENT)
Dept: RADIATION ONCOLOGY | Facility: HOSPITAL | Age: 73
Discharge: HOME | End: 2023-10-18

## 2023-10-18 LAB
RAD ONC ARIA COURSE ID: NORMAL
RAD ONC ARIA COURSE LAST TREATMENT DATE: NORMAL
RAD ONC ARIA COURSE START DATE: NORMAL
RAD ONC ARIA COURSE TREATMENT ELAPSED DAYS: 29
RAD ONC ARIA FIRST TREATMENT DATE: NORMAL
RAD ONC ARIA PLAN FRACTIONS TREATED TO DATE: 19
RAD ONC ARIA PLAN ID: NORMAL
RAD ONC ARIA PLAN PRESCRIBED DOSE PER FRACTION: 2 GY
RAD ONC ARIA PLAN PRIMARY REFERENCE POINT: NORMAL
RAD ONC ARIA PLAN TOTAL FRACTIONS PRESCRIBED: 31
RAD ONC ARIA PLAN TOTAL PRESCRIBED DOSE: 6200 CGY
RAD ONC ARIA REFERENCE POINT DOSAGE GIVEN TO DATE: 42 GY
RAD ONC ARIA REFERENCE POINT ID: NORMAL
RAD ONC ARIA REFERENCE POINT SESSION DOSAGE GIVEN: 2 GY

## 2023-10-18 PROCEDURE — 77386: CPT | Performed by: RADIOLOGY

## 2023-10-18 PROCEDURE — 77014 CHG CT GUIDANCE RADIATION THERAPY FLDS PLACEMENT: CPT | Performed by: RADIOLOGY

## 2023-10-18 PROCEDURE — 77427 RADIATION TX MANAGEMENT X5: CPT | Performed by: RADIOLOGY

## 2023-10-19 ENCOUNTER — HOSPITAL ENCOUNTER (OUTPATIENT)
Dept: RADIATION ONCOLOGY | Facility: HOSPITAL | Age: 73
Discharge: HOME | End: 2023-10-19

## 2023-10-19 LAB
RAD ONC ARIA COURSE ID: NORMAL
RAD ONC ARIA COURSE LAST TREATMENT DATE: NORMAL
RAD ONC ARIA COURSE START DATE: NORMAL
RAD ONC ARIA COURSE TREATMENT ELAPSED DAYS: 30
RAD ONC ARIA FIRST TREATMENT DATE: NORMAL
RAD ONC ARIA PLAN FRACTIONS TREATED TO DATE: 20
RAD ONC ARIA PLAN ID: NORMAL
RAD ONC ARIA PLAN PRESCRIBED DOSE PER FRACTION: 2 GY
RAD ONC ARIA PLAN PRIMARY REFERENCE POINT: NORMAL
RAD ONC ARIA PLAN TOTAL FRACTIONS PRESCRIBED: 31
RAD ONC ARIA PLAN TOTAL PRESCRIBED DOSE: 6200 CGY
RAD ONC ARIA REFERENCE POINT DOSAGE GIVEN TO DATE: 44 GY
RAD ONC ARIA REFERENCE POINT ID: NORMAL
RAD ONC ARIA REFERENCE POINT SESSION DOSAGE GIVEN: 2 GY

## 2023-10-19 PROCEDURE — 77386: CPT | Performed by: STUDENT IN AN ORGANIZED HEALTH CARE EDUCATION/TRAINING PROGRAM

## 2023-10-19 PROCEDURE — 77014 CHG CT GUIDANCE RADIATION THERAPY FLDS PLACEMENT: CPT | Performed by: STUDENT IN AN ORGANIZED HEALTH CARE EDUCATION/TRAINING PROGRAM

## 2023-10-19 PROCEDURE — 77336 RADIATION PHYSICS CONSULT: CPT | Performed by: RADIOLOGY

## 2023-10-23 ENCOUNTER — RADIATION ONCOLOGY WEEKLY ASSESSMENT (OUTPATIENT)
Dept: RADIATION ONCOLOGY | Facility: HOSPITAL | Age: 73
End: 2023-10-23
Payer: COMMERCIAL

## 2023-10-23 ENCOUNTER — HOSPITAL ENCOUNTER (OUTPATIENT)
Dept: RADIATION ONCOLOGY | Facility: HOSPITAL | Age: 73
Discharge: HOME | End: 2023-10-23
Payer: COMMERCIAL

## 2023-10-23 VITALS
OXYGEN SATURATION: 95 % | WEIGHT: 238.4 LBS | HEART RATE: 66 BPM | RESPIRATION RATE: 18 BRPM | DIASTOLIC BLOOD PRESSURE: 81 MMHG | SYSTOLIC BLOOD PRESSURE: 146 MMHG

## 2023-10-23 DIAGNOSIS — C44.219: Primary | ICD-10-CM

## 2023-10-23 LAB
RAD ONC ARIA COURSE ID: NORMAL
RAD ONC ARIA COURSE LAST TREATMENT DATE: NORMAL
RAD ONC ARIA COURSE START DATE: NORMAL
RAD ONC ARIA COURSE TREATMENT ELAPSED DAYS: 34
RAD ONC ARIA FIRST TREATMENT DATE: NORMAL
RAD ONC ARIA PLAN FRACTIONS TREATED TO DATE: 21
RAD ONC ARIA PLAN ID: NORMAL
RAD ONC ARIA PLAN PRESCRIBED DOSE PER FRACTION: 2 GY
RAD ONC ARIA PLAN PRIMARY REFERENCE POINT: NORMAL
RAD ONC ARIA PLAN TOTAL FRACTIONS PRESCRIBED: 31
RAD ONC ARIA PLAN TOTAL PRESCRIBED DOSE: 6200 CGY
RAD ONC ARIA REFERENCE POINT DOSAGE GIVEN TO DATE: 46 GY
RAD ONC ARIA REFERENCE POINT ID: NORMAL
RAD ONC ARIA REFERENCE POINT SESSION DOSAGE GIVEN: 2 GY

## 2023-10-23 PROCEDURE — 77386: CPT | Performed by: RADIOLOGY

## 2023-10-23 NOTE — PROGRESS NOTES
NAME: Jed Strong DATE: 10/23/2023   : 1950    MRN: [id number] DIAGNOSIS:   Encounter Diagnosis   Name Primary?    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 ID Energy Dose/Fx (cGy) #Fx Dose Correction (cGy) Total Dose (cGy) Start Date End Date Elapsed Days   LtEar:1 LtEar 6X 200 2 / 2 0 400 2023 1   LtEar:2 LtEar 6X 200  0 4,200 2023 10/23/2023 32     Irritation at ear increased from wearing his sunglasses--was wearing a barrier pad to avoid friction but   Has forgotten lately  Silvadene has helped.      General:     Review of Systems  [x] No new complaints [] Cough  [] Dysphagia  [] Bowel changes  [] Fever/chills   [] Nausea/vomiting  [] Skin itching/soreness/breakdown  [x] Fatigue, severity:  1   [] Nausea regimen:    [] Pain medication regimen:    [] Bowel regimen:    [x] Skin regimen:  Silvadene    Subjective:  No bleeding or pain  Silvadene has helped      KPS: 80     Vital Signs: /81   Pulse 66   Resp 18   Wt 108 kg (238 lb 6.4 oz)   SpO2 95%     Weight:   Wt Readings from Last 3 Encounters:   10/23/23 108 kg (238 lb 6.4 oz)   10/16/23 107 kg (236 lb 6.4 oz)   10/09/23 107 kg (236 lb)       Medication:   Current Outpatient Medications:     silver sulfadiazine (SILVADENE, SSD) 1 % cream, Apply 1 application  topically to the appropriate area as directed 2 (Two) Times a Day. Behind left ear, Disp: 50 g, Rfl: 2     LABS (Reviewed):  Hematology    Chemistry   Lab Results   Component Value Date    CREATININE 1.20 2021       Physical Exam:     Neck: [] Lymphadenopathy  Lungs: [x] Clear to auscultation  CVS: [x] Regular rate & rhythm  Skin: [x] Grade: 2--component of XRT but also from the rubbing/friction  Radiation alopecia    Comments/Notes:      [x] Review of labs, images, dosimetry, dose delivered, & treatment parameters.   Comments:      [x] Patient treatment setup reviewed.    Comments:     Recommendations:  Silvadene TID  Non-stick pad for glasses given  Will monitor his skin this week very closely; with his TLD dosing, I do not think I will be recommending 33 fractions but likely stopping at 30-31 fractions.  Monitoring the area closely    [x] Continue RT  [] Change RT Plan [] Hold RT, length:      Approved Electronically By:  Ian La MD, 10/23/2023, 13:18 EDT      Confidentiality of this medical record shall be maintained except when use or disclosure is required or permitted by law, regulation or written authorization by the patient.

## 2023-10-24 ENCOUNTER — HOSPITAL ENCOUNTER (OUTPATIENT)
Dept: RADIATION ONCOLOGY | Facility: HOSPITAL | Age: 73
Discharge: HOME | End: 2023-10-24

## 2023-10-24 LAB
RAD ONC ARIA COURSE ID: NORMAL
RAD ONC ARIA COURSE LAST TREATMENT DATE: NORMAL
RAD ONC ARIA COURSE START DATE: NORMAL
RAD ONC ARIA COURSE TREATMENT ELAPSED DAYS: 35
RAD ONC ARIA FIRST TREATMENT DATE: NORMAL
RAD ONC ARIA PLAN FRACTIONS TREATED TO DATE: 22
RAD ONC ARIA PLAN ID: NORMAL
RAD ONC ARIA PLAN PRESCRIBED DOSE PER FRACTION: 2 GY
RAD ONC ARIA PLAN PRIMARY REFERENCE POINT: NORMAL
RAD ONC ARIA PLAN TOTAL FRACTIONS PRESCRIBED: 31
RAD ONC ARIA PLAN TOTAL PRESCRIBED DOSE: 6200 CGY
RAD ONC ARIA REFERENCE POINT DOSAGE GIVEN TO DATE: 48 GY
RAD ONC ARIA REFERENCE POINT ID: NORMAL
RAD ONC ARIA REFERENCE POINT SESSION DOSAGE GIVEN: 2 GY

## 2023-10-24 PROCEDURE — 77386: CPT | Performed by: RADIOLOGY

## 2023-10-25 ENCOUNTER — HOSPITAL ENCOUNTER (OUTPATIENT)
Dept: RADIATION ONCOLOGY | Facility: HOSPITAL | Age: 73
Discharge: HOME | End: 2023-10-25

## 2023-10-30 ENCOUNTER — APPOINTMENT (OUTPATIENT)
Dept: RADIATION ONCOLOGY | Facility: HOSPITAL | Age: 73
End: 2023-10-30
Payer: COMMERCIAL

## 2023-11-01 ENCOUNTER — HOSPITAL ENCOUNTER (OUTPATIENT)
Dept: RADIATION ONCOLOGY | Facility: HOSPITAL | Age: 73
Setting detail: RADIATION/ONCOLOGY SERIES
End: 2023-11-01
Payer: COMMERCIAL

## 2023-11-01 ENCOUNTER — HOSPITAL ENCOUNTER (OUTPATIENT)
Dept: RADIATION ONCOLOGY | Facility: HOSPITAL | Age: 73
Discharge: HOME | End: 2023-11-01

## 2023-11-01 LAB
RAD ONC ARIA COURSE ID: NORMAL
RAD ONC ARIA COURSE LAST TREATMENT DATE: NORMAL
RAD ONC ARIA COURSE START DATE: NORMAL
RAD ONC ARIA COURSE TREATMENT ELAPSED DAYS: 43
RAD ONC ARIA FIRST TREATMENT DATE: NORMAL
RAD ONC ARIA PLAN FRACTIONS TREATED TO DATE: 23
RAD ONC ARIA PLAN ID: NORMAL
RAD ONC ARIA PLAN PRESCRIBED DOSE PER FRACTION: 2 GY
RAD ONC ARIA PLAN PRIMARY REFERENCE POINT: NORMAL
RAD ONC ARIA PLAN TOTAL FRACTIONS PRESCRIBED: 31
RAD ONC ARIA PLAN TOTAL PRESCRIBED DOSE: 6200 CGY
RAD ONC ARIA REFERENCE POINT DOSAGE GIVEN TO DATE: 50 GY
RAD ONC ARIA REFERENCE POINT ID: NORMAL
RAD ONC ARIA REFERENCE POINT SESSION DOSAGE GIVEN: 2 GY

## 2023-11-01 PROCEDURE — 77014 CHG CT GUIDANCE RADIATION THERAPY FLDS PLACEMENT: CPT | Performed by: RADIOLOGY

## 2023-11-01 PROCEDURE — 77386: CPT | Performed by: RADIOLOGY

## 2023-11-03 ENCOUNTER — HOSPITAL ENCOUNTER (OUTPATIENT)
Dept: RADIATION ONCOLOGY | Facility: HOSPITAL | Age: 73
Discharge: HOME | End: 2023-11-03

## 2023-11-03 LAB
RAD ONC ARIA COURSE ID: NORMAL
RAD ONC ARIA COURSE LAST TREATMENT DATE: NORMAL
RAD ONC ARIA COURSE START DATE: NORMAL
RAD ONC ARIA COURSE TREATMENT ELAPSED DAYS: 45
RAD ONC ARIA FIRST TREATMENT DATE: NORMAL
RAD ONC ARIA PLAN FRACTIONS TREATED TO DATE: 24
RAD ONC ARIA PLAN ID: NORMAL
RAD ONC ARIA PLAN PRESCRIBED DOSE PER FRACTION: 2 GY
RAD ONC ARIA PLAN PRIMARY REFERENCE POINT: NORMAL
RAD ONC ARIA PLAN TOTAL FRACTIONS PRESCRIBED: 31
RAD ONC ARIA PLAN TOTAL PRESCRIBED DOSE: 6200 CGY
RAD ONC ARIA REFERENCE POINT DOSAGE GIVEN TO DATE: 52 GY
RAD ONC ARIA REFERENCE POINT ID: NORMAL
RAD ONC ARIA REFERENCE POINT SESSION DOSAGE GIVEN: 2 GY

## 2023-11-03 PROCEDURE — 77014 CHG CT GUIDANCE RADIATION THERAPY FLDS PLACEMENT: CPT | Performed by: RADIOLOGY

## 2023-11-03 PROCEDURE — 77386: CPT | Performed by: RADIOLOGY

## 2023-11-06 ENCOUNTER — RADIATION ONCOLOGY WEEKLY ASSESSMENT (OUTPATIENT)
Dept: RADIATION ONCOLOGY | Facility: HOSPITAL | Age: 73
End: 2023-11-06
Payer: COMMERCIAL

## 2023-11-06 ENCOUNTER — HOSPITAL ENCOUNTER (OUTPATIENT)
Dept: RADIATION ONCOLOGY | Facility: HOSPITAL | Age: 73
Discharge: HOME | End: 2023-11-06
Payer: COMMERCIAL

## 2023-11-06 VITALS
OXYGEN SATURATION: 96 % | WEIGHT: 237.4 LBS | TEMPERATURE: 98.3 F | HEART RATE: 72 BPM | SYSTOLIC BLOOD PRESSURE: 163 MMHG | RESPIRATION RATE: 16 BRPM | DIASTOLIC BLOOD PRESSURE: 78 MMHG

## 2023-11-06 DIAGNOSIS — C44.219: Primary | ICD-10-CM

## 2023-11-06 LAB
RAD ONC ARIA COURSE ID: NORMAL
RAD ONC ARIA COURSE LAST TREATMENT DATE: NORMAL
RAD ONC ARIA COURSE START DATE: NORMAL
RAD ONC ARIA COURSE TREATMENT ELAPSED DAYS: 48
RAD ONC ARIA FIRST TREATMENT DATE: NORMAL
RAD ONC ARIA PLAN FRACTIONS TREATED TO DATE: 25
RAD ONC ARIA PLAN ID: NORMAL
RAD ONC ARIA PLAN PRESCRIBED DOSE PER FRACTION: 2 GY
RAD ONC ARIA PLAN PRIMARY REFERENCE POINT: NORMAL
RAD ONC ARIA PLAN TOTAL FRACTIONS PRESCRIBED: 31
RAD ONC ARIA PLAN TOTAL PRESCRIBED DOSE: 6200 CGY
RAD ONC ARIA REFERENCE POINT DOSAGE GIVEN TO DATE: 54 GY
RAD ONC ARIA REFERENCE POINT ID: NORMAL
RAD ONC ARIA REFERENCE POINT SESSION DOSAGE GIVEN: 2 GY

## 2023-11-06 PROCEDURE — 77386: CPT | Performed by: RADIOLOGY

## 2023-11-06 PROCEDURE — 77014 CHG CT GUIDANCE RADIATION THERAPY FLDS PLACEMENT: CPT | Performed by: RADIOLOGY

## 2023-11-06 NOTE — PROGRESS NOTES
"NAME: Jed Strong DATE: 2023   : 1950    MRN: [id number] DIAGNOSIS: Recurrent basal cell      RADIATION ON TREATMENT VISIT NOTE - HEAD AND NECK     Treatment Summary    Treatment Site Ref. ID Energy Dose/Fx (cGy) #Fx Dose Correction (cGy) Total Dose (cGy) Start Date End Date Elapsed Days   LtEar:1 LtEar 6X 200 2 / 2 0 400 2023 1   LtEar:2 LtEar 6X 200  /  0 5,000 2023 46     Missed an additional fraction late in the week last week due to work emergency  Likely going to stop at 30-31 fractions due to the brisk reaction  General:     Review of Systems  [x] No new complaints [] Dysphagia  [] Eye pain/discharge  [] Dry mouth   [] Hair loss  [] Earache/pain/discharge  [] Loss of taste  [] Supplemental feeding: [] PO   [] PEG/NG    kcals/day  [] Mouth soreness/tenderness, severity:  none  [x] Fatigue,  severity:  1 [x] Pain, severity: 0, location: denies pain  [] Oral care regimen:    [] Pain medication regimen:    [x] Skin regimen:  Silvadene BID-TID    Subjective:  He is doing well with treatments, daughter states that his ear is looking much better than it did last week. They continue to use Silvadene cream. No concerns or complaints.    KPS: 80     Vital Signs: /78   Pulse 72   Temp 98.3 °F (36.8 °C) (Oral)   Resp 16   Wt 108 kg (237 lb 6.4 oz)   SpO2 96%     Weight:   Wt Readings from Last 3 Encounters:   23 108 kg (237 lb 6.4 oz)   10/23/23 108 kg (238 lb 6.4 oz)   10/16/23 107 kg (236 lb 6.4 oz)       Medication:   Current Outpatient Medications:     silver sulfadiazine (SILVADENE, SSD) 1 % cream, Apply 1 application  topically to the appropriate area as directed 2 (Two) Times a Day. Behind left ear, Disp: 50 g, Rfl: 2     LABS (Reviewed):  Hematology  No results found for: \"WBC\", \"RBC\", \"HGB\", \"HCT\", \"PLT\"  Chemistry   Lab Results   Component Value Date    CREATININE 1.20 2021       Physical Exam:     Neck:  [] Lymphadenopathy  Lungs:  [x] " Clear to auscultation  HEENT:[x] Throat clear [x] Oral cavity clear  [] TM clear    [] Nares patent [] Xerostomia   [] Mucositis/stomatitis    [] Dysphagia  [] PERRLA/EOMI [] Voice changes/dysarthia    [] Otitis, external ear (non-infectious):   Skin:  [x] Grade: 1-2; improved with the break    Comments/Notes:     [x] Review of labs, images, dosimetry, dose delivered, & treatment parameters.    Comments:     [x] Patient treatment setup reviewed.    Comments:     Recommendations:  continue XRT and supportive measures  Final course will be 30 or 31 fractions and not 33; will monitor at his 30th fraction  Continue Silvadene    [x] Continue RT  [] Change RT Plan [] Hold RT, length:      Approved Electronically By:  Ian La MD, 11/6/2023, 12:33 EST      Confidentiality of this medical record shall be maintained except when use or disclosure is required or permitted by law, regulation or written authorization by the patient.

## 2023-11-07 ENCOUNTER — HOSPITAL ENCOUNTER (OUTPATIENT)
Dept: RADIATION ONCOLOGY | Facility: HOSPITAL | Age: 73
Discharge: HOME | End: 2023-11-07

## 2023-11-07 LAB
RAD ONC ARIA COURSE ID: NORMAL
RAD ONC ARIA COURSE LAST TREATMENT DATE: NORMAL
RAD ONC ARIA COURSE START DATE: NORMAL
RAD ONC ARIA COURSE TREATMENT ELAPSED DAYS: 49
RAD ONC ARIA FIRST TREATMENT DATE: NORMAL
RAD ONC ARIA PLAN FRACTIONS TREATED TO DATE: 26
RAD ONC ARIA PLAN ID: NORMAL
RAD ONC ARIA PLAN PRESCRIBED DOSE PER FRACTION: 2 GY
RAD ONC ARIA PLAN PRIMARY REFERENCE POINT: NORMAL
RAD ONC ARIA PLAN TOTAL FRACTIONS PRESCRIBED: 31
RAD ONC ARIA PLAN TOTAL PRESCRIBED DOSE: 6200 CGY
RAD ONC ARIA REFERENCE POINT DOSAGE GIVEN TO DATE: 56 GY
RAD ONC ARIA REFERENCE POINT ID: NORMAL
RAD ONC ARIA REFERENCE POINT SESSION DOSAGE GIVEN: 2 GY

## 2023-11-07 PROCEDURE — 77386: CPT | Performed by: RADIOLOGY

## 2023-11-07 PROCEDURE — 77014 CHG CT GUIDANCE RADIATION THERAPY FLDS PLACEMENT: CPT | Performed by: RADIOLOGY

## 2023-11-08 ENCOUNTER — HOSPITAL ENCOUNTER (OUTPATIENT)
Dept: RADIATION ONCOLOGY | Facility: HOSPITAL | Age: 73
Discharge: HOME | End: 2023-11-08

## 2023-11-08 LAB
RAD ONC ARIA COURSE ID: NORMAL
RAD ONC ARIA COURSE LAST TREATMENT DATE: NORMAL
RAD ONC ARIA COURSE START DATE: NORMAL
RAD ONC ARIA COURSE TREATMENT ELAPSED DAYS: 50
RAD ONC ARIA FIRST TREATMENT DATE: NORMAL
RAD ONC ARIA PLAN FRACTIONS TREATED TO DATE: 27
RAD ONC ARIA PLAN ID: NORMAL
RAD ONC ARIA PLAN PRESCRIBED DOSE PER FRACTION: 2 GY
RAD ONC ARIA PLAN PRIMARY REFERENCE POINT: NORMAL
RAD ONC ARIA PLAN TOTAL FRACTIONS PRESCRIBED: 31
RAD ONC ARIA PLAN TOTAL PRESCRIBED DOSE: 6200 CGY
RAD ONC ARIA REFERENCE POINT DOSAGE GIVEN TO DATE: 58 GY
RAD ONC ARIA REFERENCE POINT ID: NORMAL
RAD ONC ARIA REFERENCE POINT SESSION DOSAGE GIVEN: 2 GY

## 2023-11-08 PROCEDURE — 77336 RADIATION PHYSICS CONSULT: CPT | Performed by: RADIOLOGY

## 2023-11-08 PROCEDURE — 77386: CPT | Performed by: RADIOLOGY

## 2023-11-08 PROCEDURE — 77014 CHG CT GUIDANCE RADIATION THERAPY FLDS PLACEMENT: CPT | Performed by: RADIOLOGY

## 2023-11-09 ENCOUNTER — HOSPITAL ENCOUNTER (OUTPATIENT)
Dept: RADIATION ONCOLOGY | Facility: HOSPITAL | Age: 73
Discharge: HOME | End: 2023-11-09

## 2023-11-09 LAB
RAD ONC ARIA COURSE ID: NORMAL
RAD ONC ARIA COURSE LAST TREATMENT DATE: NORMAL
RAD ONC ARIA COURSE START DATE: NORMAL
RAD ONC ARIA COURSE TREATMENT ELAPSED DAYS: 51
RAD ONC ARIA FIRST TREATMENT DATE: NORMAL
RAD ONC ARIA PLAN FRACTIONS TREATED TO DATE: 28
RAD ONC ARIA PLAN ID: NORMAL
RAD ONC ARIA PLAN PRESCRIBED DOSE PER FRACTION: 2 GY
RAD ONC ARIA PLAN PRIMARY REFERENCE POINT: NORMAL
RAD ONC ARIA PLAN TOTAL FRACTIONS PRESCRIBED: 31
RAD ONC ARIA PLAN TOTAL PRESCRIBED DOSE: 6200 CGY
RAD ONC ARIA REFERENCE POINT DOSAGE GIVEN TO DATE: 60 GY
RAD ONC ARIA REFERENCE POINT ID: NORMAL
RAD ONC ARIA REFERENCE POINT SESSION DOSAGE GIVEN: 2 GY

## 2023-11-09 PROCEDURE — 77014 CHG CT GUIDANCE RADIATION THERAPY FLDS PLACEMENT: CPT | Performed by: STUDENT IN AN ORGANIZED HEALTH CARE EDUCATION/TRAINING PROGRAM

## 2023-11-09 PROCEDURE — 77386: CPT | Performed by: STUDENT IN AN ORGANIZED HEALTH CARE EDUCATION/TRAINING PROGRAM

## 2023-11-13 ENCOUNTER — RADIATION ONCOLOGY WEEKLY ASSESSMENT (OUTPATIENT)
Dept: RADIATION ONCOLOGY | Facility: HOSPITAL | Age: 73
End: 2023-11-13
Payer: COMMERCIAL

## 2023-11-13 VITALS — WEIGHT: 241 LBS

## 2023-11-13 DIAGNOSIS — C44.219: Primary | ICD-10-CM

## 2023-11-14 ENCOUNTER — APPOINTMENT (OUTPATIENT)
Dept: RADIATION ONCOLOGY | Facility: HOSPITAL | Age: 73
End: 2023-11-14
Payer: COMMERCIAL

## 2023-11-16 NOTE — PROGRESS NOTES
"Patient Name: Jed Strong Date: 2023       : 1950        MRN #: [id number] Diagnosis: Recurrent basal cell.                  RADIATION ON TREATMENT VISIT NOTE - HEAD AND NECK     Treatment Summary       Treatment Site Ref. ID Energy Dose/Fx (cGy) #Fx Dose Correction (cGy) Total Dose (cGy) Start Date End Date Elapsed Days   LtEar:1 LtEar 6X 200 2 / 2 0 400 2023 1   LtEar:2 LtEar 6X 200  0 5,600 2023 49     -3 fraction boost is not needed given TLD readings and patient response(very good)/risk     General:           Review of Systems    [x] No new complaints  [] Dysphagia   [] Eye pain/discharge  [] Dry mouth   [] Hair loss   [] Earache/pain/discharge  [] Loss of taste  [] Supplemental feeding:  [] PO [] PEG/NG    kcals/d  [] Mouth soreness/tenderness,  severity: ----------------      [x] Fatigue,  severity: 1  [x] Pain,  severity: 0    Oral care regimen: NONE   Pain medication regimen: NONE   Skin regimen: Silvadene     Comments/Notes:  continue skin care/silvadene    KPS: 90       Vital Signs: Wt 109 kg (241 lb)     Weight:   Wt Readings from Last 3 Encounters:   23 109 kg (241 lb)   23 108 kg (237 lb 6.4 oz)   10/23/23 108 kg (238 lb 6.4 oz)       Medication:   Current Outpatient Medications:     silver sulfadiazine (SILVADENE, SSD) 1 % cream, Apply 1 application  topically to the appropriate area as directed 2 (Two) Times a Day. Behind left ear, Disp: 50 g, Rfl: 2       LABS (Reviewed):  Hematology No results found for: \"WBC\", \"RBC\", \"HGB\", \"HCT\", \"PLT\"   Chemistry   Lab Results   Component Value Date    CREATININE 1.20 2021         Physical Exam:         Neck: [] Lymphadenopathy  Lungs: [x] Clear to auscultation  HEENT: [] Throat clear   [x] Oral cavity clear  [] TM clear   [] Nares patent   [] Xerostomia   [] Mucositis/stomatitis   [] Dysphagia   [] Voice changes/dysarthia [] PERRLA/EOMI    [] Otitis, external ear (non-infectious): "   Skin: [x] stGstrstastdstest:st st1st Comments/Notes:     [x] Review of labs, images, dosimetry, dose delivered, & treatment parameters.    Comments:     [x] Patient treatment setup reviewed.    Comments:     Recommendations:continue skin care; close interval skin check needed.    [] Continue RT  [] Change RT Plan [] Hold RT, length:        Approved Electronically By:  Ian La MD, 11/16/2023, 09:11 EST          Confidentiality of this medical record shall be maintained except when use or disclosure is required or permitted by law, regulation or written authorization by the patient.

## 2023-12-22 NOTE — PROGRESS NOTES
RADIATION ONCOLOGY FOLLOW-UP NOTE    NAME: Jed Strong  YOB: 1950  MRN #: [id number]  DATE OF SERVICE: 12/27/2023  REFERRING PROVIDER: Jessika Murray APRN  [address] [place] [address]  PRIMARY CARE PROVIDER: Jessika Murray APRN    DIAGNOSIS:    Encounter Diagnosis   Name Primary?    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    HISTORY OF PRESENT ILLNESS: The patient is a 73 y.o. year old male who was last seen in our office on 11/09/2023 upon completion of radiation therapy treatment.    He follows with Dr. Frank, but has not been seen over the short interval.    He and his family note that he has healed great.  He denies any new lesions or discharge.    He denies any pain or adenopathy.      The following portions of the patient's history were reviewed and updated as appropriate: allergies, current medications, past family history, past medical history, past social history, past surgical history and problem list. Reviewed with the patient and remain unchanged.    PAST MEDICAL HISTORY:  he has a past medical history of Blindness of both eyes. Recurrent basal cell.    MEDICATIONS:    Current Outpatient Medications:     silver sulfadiazine (SILVADENE, SSD) 1 % cream, Apply 1 application  topically to the appropriate area as directed 2 (Two) Times a Day. Behind left ear, Disp: 50 g, Rfl: 2    ALLERGIES:  No Known Allergies    PAST SURGICAL HISTORY:  MOHS.    PREVIOUS RADIOTHERAPY OR CHEMOTHERAPY:  XRT as noted above    FAMILY HISTORY:  non-contributory today.    SOCIAL HISTORY:  he reports that he quit smoking about 31 years ago. His smoking use included cigarettes. He has a 30.00 pack-year smoking history. He has never used smokeless tobacco. He reports current alcohol use. He reports that he does not use drugs.    PAIN AND PAIN  MANAGEMENT:  no pain.  Vitals:    12/27/23 1300   BP: 132/82   Pulse: 74   Resp: 18   SpO2: 95%   PainSc: 0-No pain       NUTRITIONAL STATUS:   no issues    KPS:  100      REVIEW OF SYSTEMS:   General: No fevers, chills, weight change, or drenching night sweats. Skin: No rashes or jaundice.  HEENT: No change in vision or hearing, no headaches.  Neck: No dysphagia or masses.    Objective   VITAL SIGNS:  Vitals:    12/27/23 1300   BP: 132/82   Pulse: 74   Resp: 18   SpO2: 95%   PainSc: 0-No pain     PHYSICAL EXAM:  GENERAL: In no apparent distress, sitting comfortably in room.    HEENT: Normocephalic, atraumatic. Pupils are equal, round, reactive to light. Sclera anicteric. Conjunctiva not injected. Oropharynx without erythema, ulcerations or thrush.  Left ear s/p MOHs and then recurrence posterior/cephalad from that; now with great response to adjuvant/salvage XRT with associated radiation alopecia adjacent but no residual mass or concerning findings on exam.  NECK: Supple with no masses.  LYMPHATIC: No cervical, supraclavicular or axillary adenopathy appreciated bilaterally.   EXTREMITIES: No clubbing, cyanosis, edema.  SKIN: No erythema, rashes, ulcerations noted.       PERTINENT IMAGING/PATHOLOGY/LABS (Medical Decision Making):     COORDINATION OF CARE: A copy of this note is sent to the referring provider.    PATHOLOGY (Reviewed):     IMAGING (Reviewed):     LABS (Reviewed):  HEMAT  CHEMISTRY:  Lab Results   Component Value Date    CREATININE 1.20 06/29/2021     Assessment & Plan   ASSESSMENT AND PLAN:    1. 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        This assessment comes from my review of the imaging, pathology, physician notes and other pertinent information as mentioned.    DISPOSITION: 6 month f/u  here.        CC: CHRISTOPHER Velázquez    Approved by:     Ian La MD

## 2023-12-27 ENCOUNTER — OFFICE VISIT (OUTPATIENT)
Dept: RADIATION ONCOLOGY | Facility: HOSPITAL | Age: 73
End: 2023-12-27
Payer: COMMERCIAL

## 2023-12-27 VITALS
DIASTOLIC BLOOD PRESSURE: 82 MMHG | RESPIRATION RATE: 18 BRPM | OXYGEN SATURATION: 95 % | HEART RATE: 74 BPM | SYSTOLIC BLOOD PRESSURE: 132 MMHG

## 2023-12-27 DIAGNOSIS — C44.219: Primary | ICD-10-CM

## 2023-12-27 PROCEDURE — G0463 HOSPITAL OUTPT CLINIC VISIT: HCPCS | Performed by: RADIOLOGY

## 2024-07-01 NOTE — PROGRESS NOTES
[facility] Radiation Oncology   Follow Up    Chief Complaint  Recurrent BCC of the left pinna      Diagnosis: Recurrent BCC of the left pinna    6000 cGy in 30 fractions to left ear completed 11/09/2023.       Interval History:    Jed Strong presents for a 6 month follow up. He was last seen in our office on 12/27/2023 by Dr. Ian La. The plan was to follow up here in 6 months.     He was following with Dr. Kiet Frank with [facility], but has not been seen since 05/29/2023. He was scheduled for follow up on 11/08/2023, but cancelled that appointment.    He has no new or concerning symptoms. No concerning side effects.       Imaging:      No interval imaging.      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     No current facility-administered medications on file prior to visit.          Allergies:  No Known Allergies        Vital Signs:  /81   Pulse 69   Resp 18   Wt 130 kg (286 lb 4.8 oz)   SpO2 94%   There is no height or weight on file to calculate BMI.  Pain Score    07/08/24 1349   PainSc: 0-No pain         ECOG: Restricted in physically strenuous activity but ambulatory and able to carry out work of a light or sedentary nature, e.g., light house work, office work = 1    Physical Exam  Vitals reviewed.   Constitutional:       General: He is not in acute distress.     Appearance: Normal appearance.   HENT:      Head: Normocephalic and atraumatic.      Comments: Left pinna posterior helix resected. No new or concerning findings on the residual pinna. No cervical LAD.   Eyes:      Extraocular Movements: Extraocular movements intact.      Pupils: Pupils are equal, round, and reactive to light.    Pulmonary:      Effort: Pulmonary effort is normal.   Abdominal:      General: Abdomen is flat.      Palpations: Abdomen is soft.   Musculoskeletal:      Cervical back: Normal range of motion and neck supple.   Skin:     General: Skin is warm and dry.   Neurological:      General: No focal deficit present.      Mental Status: He is alert and oriented to person, place, and time.   Psychiatric:         Mood and Affect: Mood normal.         Behavior: Behavior normal.            Result Review :  The following data was reviewed by: Franck Patel MD on 07/08/2024:  Labs: Last Creatinine            Diagnoses and all orders for this visit:    1. 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 He was last seen in our office on 12/27/2023 by Dr. Ian La. The plan was to follow up here in 6 months.     He was following with Dr. Kiet Frank with [facility], but has not been seen since 05/29/2023. He was scheduled for follow up on 11/08/2023, but cancelled that appointment.    He has no new or concerning symptoms. No concerning side effects.     I met with the patient and performed a detailed examination of the left ear and neck. No concerning findings. He can follow up with me in 1 year. I recommended that he continue follow up with dermatology.     Plan:    -Follow up in 1 year  -Encouraged him to continue dermatology follow up       I spent 20 minutes caring for Jed on this date of service. This time includes time spent by me in the following activities:preparing for the visit, reviewing tests, obtaining and/or reviewing a separately obtained history, documenting information in the medical record, independently interpreting results and communicating that information with the patient/family/caregiver, and care coordination  Follow Up   No follow-ups on file.  Patient was given instructions and counseling  regarding his condition or for health maintenance advice. Please see specific information pulled into the AVS if appropriate.     Franck Patel MD

## 2024-07-08 ENCOUNTER — OFFICE VISIT (OUTPATIENT)
Dept: RADIATION ONCOLOGY | Facility: HOSPITAL | Age: 74
End: 2024-07-08
Payer: COMMERCIAL

## 2024-07-08 VITALS
RESPIRATION RATE: 18 BRPM | SYSTOLIC BLOOD PRESSURE: 132 MMHG | WEIGHT: 286.3 LBS | HEART RATE: 69 BPM | DIASTOLIC BLOOD PRESSURE: 81 MMHG | OXYGEN SATURATION: 94 %

## 2024-07-08 DIAGNOSIS — C44.219: Primary | ICD-10-CM

## 2024-07-08 PROCEDURE — G0463 HOSPITAL OUTPT CLINIC VISIT: HCPCS | Performed by: STUDENT IN AN ORGANIZED HEALTH CARE EDUCATION/TRAINING PROGRAM

## 2025-07-07 NOTE — PROGRESS NOTES
[facility] Radiation Oncology   Follow Up    CChief Complaint  Recurrent BCC of the left pinna        Diagnosis: Recurrent BCC of the left pinna       Radiation Start Date: 09/19/2023    Radiation Completion Date:  11/9/2023    Prescription:     Site: Left Ear  Laterality: Left  Total Dose: 6600 cGy  Dose per Fraction: 200 cGy  Total Fractions: 33  Daily or BID: Daily  Modality: Photon  Technique: IMRT/VMAT/Rapid Arc  Bolus: No        Interval History:    Jed Strong is a 75-year-old male who was diagnosed with recurrent infiltrative basal cell carcinoma of the left inferior posterior helix in April 2023 following biopsy by shave method of a growing and not healing lesion. He had the growth for years. Initial basal cell on the left ear in this region was treated in 2004. He underwent Mohs procedure on 5/9/2023 with Dr. Stanley Howard. Final pathology revealed perineural inflammation but negative margins. He completed radiation therapy to the left ear on 11/9/2023. He presents to our clinic today for routine annual follow-up and surveillance.  He has no new or concerning symptoms related to his prior radiation.  He has another scabbing/ulcerative lesion developing on the right pinna.      Imaging:      No recent imaging to review.        Pathology:      No recent pathology to review.         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     No current facility-administered medications on file prior to visit.          Allergies:  No Known Allergies        Vital Signs:  /91   Pulse 75   Resp 20   SpO2 90%   There is no height or weight on file to calculate BMI.  Pain Score    07/08/25 1411   PainSc: 0-No pain         ECOG: Restricted in physically strenuous activity but  ambulatory and able to carry out work of a light or sedentary nature, e.g., light house work, office work = 1    Physical Exam  Vitals reviewed.   Constitutional:       General: He is not in acute distress.     Appearance: Normal appearance.   HENT:      Head: Normocephalic and atraumatic.      Comments: Left pinna posterior helix resected. No new or concerning findings on the residual pinna.     The patient has an approximately 1 cm area of scabbing/dried blood with an associated nodule concerning for a new cutaneous malignancy on the right helix  Eyes:      Extraocular Movements: Extraocular movements intact.      Pupils: Pupils are equal, round, and reactive to light.   Pulmonary:      Effort: Pulmonary effort is normal.   Abdominal:      General: Abdomen is flat.      Palpations: Abdomen is soft.   Musculoskeletal:      Cervical back: Normal range of motion and neck supple.   Skin:     General: Skin is warm and dry.   Neurological:      General: No focal deficit present.      Mental Status: He is alert and oriented to person, place, and time.   Psychiatric:         Mood and Affect: Mood normal.         Behavior: Behavior normal.            Result Review :  The following data was reviewed by: Franck Patel MD on 07/08/2025:  Labs: Last Creatinine             Diagnoses and all orders for this visit:    1. 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 He had the growth for years. Initial basal cell on the left ear in this region was treated in 2004. He underwent Mohs procedure on 5/9/2023 with Dr. Stanley Howard. Final pathology revealed perineural inflammation but negative margins. He completed radiation therapy to the left ear on  11/9/2023. He presents to our clinic today for routine annual follow-up and surveillance.  He has no new or concerning symptoms related to his prior radiation.  He has another scabbing/ulcerative lesion developing on the right pinna.    I met with the patient and performed a detailed examination of his previously radiated site.  This looks well-healed, but on his contralateral right helix there is concern for a new cutaneous lesion.  He reports that it has been present for several months and has been bleeding intermittently.  I recommended that they report back to their dermatologist for evaluation/biopsy/excision.  If need be this can be easily addressed with radiation therapy.  He can otherwise follow-up with me in a year.      Plan:    -Patient to present to dermatology to evaluate bleeding lesion on right helix  -Follow-up in a year       I spent 20 minutes caring for Jed on this date of service. This time includes time spent by me in the following activities:preparing for the visit, reviewing tests, obtaining and/or reviewing a separately obtained history, performing a medically appropriate examination and/or evaluation , documenting information in the medical record, independently interpreting results and communicating that information with the patient/family/caregiver, and care coordination  Follow Up   No follow-ups on file.  Patient was given instructions and counseling regarding his condition or for health maintenance advice. Please see specific information pulled into the AVS if appropriate.     Franck Patel MD

## 2025-07-08 ENCOUNTER — OFFICE VISIT (OUTPATIENT)
Dept: RADIATION ONCOLOGY | Facility: HOSPITAL | Age: 75
End: 2025-07-08
Payer: COMMERCIAL

## 2025-07-08 VITALS
SYSTOLIC BLOOD PRESSURE: 160 MMHG | HEART RATE: 75 BPM | OXYGEN SATURATION: 90 % | DIASTOLIC BLOOD PRESSURE: 91 MMHG | RESPIRATION RATE: 20 BRPM

## 2025-07-08 DIAGNOSIS — C44.219: Primary | ICD-10-CM

## 2025-07-08 PROCEDURE — G0463 HOSPITAL OUTPT CLINIC VISIT: HCPCS | Performed by: STUDENT IN AN ORGANIZED HEALTH CARE EDUCATION/TRAINING PROGRAM
